# Patient Record
Sex: MALE | Race: WHITE | Employment: FULL TIME | URBAN - METROPOLITAN AREA
[De-identification: names, ages, dates, MRNs, and addresses within clinical notes are randomized per-mention and may not be internally consistent; named-entity substitution may affect disease eponyms.]

---
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
## 2021-02-19 DIAGNOSIS — J45.30 MILD PERSISTENT ASTHMA WITHOUT COMPLICATION: ICD-10-CM

## 2021-02-19 DIAGNOSIS — R06.2 WHEEZING: ICD-10-CM

## 2021-02-19 DIAGNOSIS — F90.1 ATTENTION DEFICIT HYPERACTIVITY DISORDER (ADHD), PREDOMINANTLY HYPERACTIVE TYPE: Chronic | ICD-10-CM

## 2021-02-19 RX ORDER — ALBUTEROL SULFATE 0.83 MG/ML
2.5 SOLUTION RESPIRATORY (INHALATION)
Qty: 25 EACH | Refills: 0 | Status: SHIPPED | OUTPATIENT
Start: 2021-02-19 | End: 2021-12-26 | Stop reason: SDUPTHER

## 2021-02-19 RX ORDER — PREDNISONE 10 MG/1
TABLET ORAL
Qty: 21 TAB | Refills: 0 | Status: SHIPPED | OUTPATIENT
Start: 2021-02-19 | End: 2021-05-28

## 2021-05-21 RX ORDER — CYCLOBENZAPRINE HCL 10 MG
10 TABLET ORAL
Qty: 30 TABLET | Refills: 1 | Status: SHIPPED | OUTPATIENT
Start: 2021-05-21 | End: 2022-03-04

## 2021-05-28 ENCOUNTER — VIRTUAL VISIT (OUTPATIENT)
Dept: INTERNAL MEDICINE CLINIC | Age: 21
End: 2021-05-28
Payer: COMMERCIAL

## 2021-05-28 DIAGNOSIS — J01.10 ACUTE NON-RECURRENT FRONTAL SINUSITIS: Primary | ICD-10-CM

## 2021-05-28 DIAGNOSIS — F32.A DEPRESSION, UNSPECIFIED DEPRESSION TYPE: ICD-10-CM

## 2021-05-28 DIAGNOSIS — E78.5 DYSLIPIDEMIA: ICD-10-CM

## 2021-05-28 DIAGNOSIS — F90.1 ATTENTION DEFICIT HYPERACTIVITY DISORDER (ADHD), PREDOMINANTLY HYPERACTIVE TYPE: ICD-10-CM

## 2021-05-28 DIAGNOSIS — J45.40 MODERATE PERSISTENT ASTHMA WITHOUT COMPLICATION: ICD-10-CM

## 2021-05-28 DIAGNOSIS — Z11.59 ENCOUNTER FOR HEPATITIS C SCREENING TEST FOR LOW RISK PATIENT: ICD-10-CM

## 2021-05-28 PROCEDURE — 99214 OFFICE O/P EST MOD 30 MIN: CPT | Performed by: INTERNAL MEDICINE

## 2021-05-28 RX ORDER — SERTRALINE HYDROCHLORIDE 50 MG/1
50 TABLET, FILM COATED ORAL DAILY
Qty: 30 TABLET | Refills: 5 | Status: SHIPPED | OUTPATIENT
Start: 2021-05-28 | End: 2021-06-11 | Stop reason: ALTCHOICE

## 2021-05-28 RX ORDER — AMOXICILLIN AND CLAVULANATE POTASSIUM 875; 125 MG/1; MG/1
1 TABLET, FILM COATED ORAL EVERY 12 HOURS
Qty: 14 TABLET | Refills: 0 | Status: SHIPPED | OUTPATIENT
Start: 2021-05-28 | End: 2021-11-23

## 2021-05-28 NOTE — PROGRESS NOTES
Gayatri Javier is a 22 y.o. male who was seen by synchronous (real-time) audio-video technology on 5/28/2021 for Sinus Infection (going on for about 3-4 days, Advil was taken) and Depression        Progress Note       SUBJECTIVE:   Mr. Gayatri Javier is a 22 y.o. male who is here for follow up of routine medical issues. Chief Complaint   Patient presents with    Sinus Infection     going on for about 3-4 days, Advil was taken    Depression       He has had sinus pain, headaches, \"slight fever. \" Seen in urgent care, negative COVID. Depression: \"It has always been there, but never enough to overwhelm. \" He has had episodes of social withdrawal and \"I don't eat much. \" +\"Stressed\". He has not been on medications. He denies suicidality. Vyvanse works well for his ADHD. Insomnia: \"Depends\"--if he goes to bed late, he sleeps better. He is rarely using melatonin for sleep. At this time, he is otherwise doing well and has brought no other complaints to my attention today. For a list of the medical issues addressed today, see the assessment and plan below.     PMH:   Past Medical History:   Diagnosis Date    ADHD (attention deficit hyperactivity disorder)     Asthma     Bronchitis 10-21-05    2-21-06 1-26-07  1-21-09    Clavicle fracture 09-07-07    Family history of celiac disease 10/6/2017    Sister     Family history of diabetes mellitus (DM) 10/6/2017    sister    Fifth disease 3-29-08    Mono 4-4-05    Otitis 2-22-05    12 times since 2005 most [id number]    Otitis media     Pneumonia 11-10-05    Pneumonia 08/31/2016    BLL pneumonia    Reactive airway disease     Severe obesity ([address] 10/14/2019    Sinusitis 3-11-05    5 times since 2005    Strep sore throat 4-14-09 9-14-09       Past Surgical History:   Procedure Laterality Date    HX CIRCUMCISION      HX HEENT      HX TYMPANOSTOMY  7-2005       All: He is allergic to other food, tree nut, and zithromax [azithromycin]. Current Outpatient Medications   Medication Sig    cyclobenzaprine (FLEXERIL) 10 mg tablet Take 1 Tablet by mouth three (3) times daily as needed for Muscle Spasm(s).  lisdexamfetamine (Vyvanse) 30 mg capsule Take 1 Cap by mouth every morning. Max Daily Amount: 30 mg. Indications: attention deficit disorder with hyperactivity    albuterol (PROVENTIL VENTOLIN) 2.5 mg /3 mL (0.083 %) nebu 3 mL by Nebulization route every four (4) hours as needed for Wheezing.  predniSONE (STERAPRED DS) 10 mg dose pack See administration instruction per 10mg dose pack    albuterol (PROVENTIL HFA, VENTOLIN HFA, PROAIR HFA) 90 mcg/actuation inhaler INHALE 2 PUFFS BY MOUTH EVERY 6 HOURS AS NEEDED FOR WHEEZING    fluticasone propionate (FLOVENT HFA) 44 mcg/actuation inhaler Take 2 Puffs by inhalation two (2) times a day.  EPINEPHrine (EPIPEN) 0.3 mg/0.3 mL injection 0.3 mL by IntraMUSCular route once as needed. Indications: Anaphylaxis     No current facility-administered medications for this visit. FH: His family history includes Asthma in his father. SH: Currently he is out of college. He is  at [employer]. He reports that he has never smoked. He has never used smokeless tobacco. He reports that he does not drink alcohol and does not use drugs. ROS: See above; Complete ROS otherwise negative. OBJECTIVE:   Vitals: There were no vitals taken for this visit. Gen: Pleasant 22 y.o.  male in [facility].       Lab Results   Component Value Date/Time    Sodium 141 12/24/2019 10:24 AM    Potassium 3.7 12/24/2019 10:24 AM    Chloride 103 12/24/2019 10:24 AM    CO2 23 12/24/2019 10:24 AM    Glucose 75 12/24/2019 10:24 AM    BUN 11 12/24/2019 10:24 AM    Creatinine 1.05 12/24/2019 10:24 AM    BUN/Creatinine ratio 10 12/24/2019 10:24 AM    GFR est  12/24/2019 10:24 AM    GFR est non- 12/24/2019 10:24 AM    Calcium 9.6 12/24/2019 10:24 AM    Bilirubin, total 0.4 12/24/2019 10:24 AM    ALT (SGPT) 32 12/24/2019 10:24 AM    Alk. phosphatase 94 12/24/2019 10:24 AM    Protein, total 6.8 12/24/2019 10:24 AM    Albumin 4.5 12/24/2019 10:24 AM    A-G Ratio 2.0 12/24/2019 10:24 AM       Lab Results   Component Value Date/Time    Cholesterol, total 164 12/24/2019 10:24 AM    HDL Cholesterol 24 (L) 12/24/2019 10:24 AM    LDL, calculated 80 12/24/2019 10:24 AM    Triglyceride 300 (H) 12/24/2019 10:24 AM        Lab Results   Component Value Date/Time    Hemoglobin A1c 5.1 12/24/2019 10:24 AM       Lab Results   Component Value Date/Time    WBC 7.9 10/23/2018 11:19 AM    Hemoglobin (POC) 14.2 10/06/2017 03:53 PM    HGB 15.2 10/23/2018 11:19 AM    HCT 43.8 10/23/2018 11:19 AM    PLATELET 506 03/69/8296 11:19 AM    MCV 82 10/23/2018 11:19 AM         ASSESSMENT/ PLAN: Diagnoses and all orders for this visit:    Acute non-recurrent frontal sinusitis  -     amoxicillin-clavulanate (AUGMENTIN) 875-125 mg per tablet; Take 1 Tablet by mouth every twelve (12) hours. , Normal, Disp-14 Tablet, R-0    Depression, unspecified depression type: No SI. +Anhedonia, social withdrawal, appetite loss. +Anxiety. -     sertraline (ZOLOFT) 50 mg tablet; Take 1 Tablet by mouth daily. , Normal, Disp-30 Tablet, R-5    Mild persistent asthma: Continue inhaler. Rarely needs it these days. Attention deficit hyperactivity disorder (ADHD), predominantly hyperactive type: Consider psych referral down the road. For now, the vyvanse is doing well and we'll continue it.   -     lisdexamfetamine (VYVANSE) 30 mg capsule; Take 1 Cap by mouth every morning. Max Daily Amount: 30 mg. Indications: Attention Deficit Disorder with Hyperactivity  -     METABOLIC PANEL, COMPREHENSIVE    Dyslipidemia: Predominantly hypertriglyceridemia.   -     METABOLIC PANEL, COMPREHENSIVE  -     LIPID PANEL    Obesity ([address]): Diet and exercise. Allergic rhinitis, unspecified seasonality, unspecified trigger: Quiescent at this time. F/u in about a year.        I have reviewed the patient's medications and risks/side effects/benefits were discussed. Diagnosis(-es) explained to patient and questions answered. Literature provided where appropriate. Objective:   No flowsheet data found. [INSTRUCTIONS:  \"[x]\" Indicates a positive item  \"[]\" Indicates a negative item  -- DELETE ALL ITEMS NOT EXAMINED]    Constitutional: [x] Appears well-developed and well-nourished [x] No apparent distress      [] Abnormal -     Mental status: [x] Alert and awake  [x] Oriented to person/place/time [x] Able to follow commands    [] Abnormal -     Eyes:   EOM    [x]  Normal    [] Abnormal -   Sclera  [x]  Normal    [] Abnormal -          Discharge [x]  None visible   [] Abnormal -     HENT: [x] Normocephalic, atraumatic  [] Abnormal -   [x] Mouth/Throat: Mucous membranes are moist    External Ears [x] Normal  [] Abnormal -    Neck: [x] No visualized mass [] Abnormal -     Pulmonary/Chest: [x] Respiratory effort normal   [x] No visualized signs of difficulty breathing or respiratory distress        [] Abnormal -      Musculoskeletal:   [x] Normal gait with no signs of ataxia         [x] Normal range of motion of neck        [] Abnormal -     Neurological:        [x] No Facial Asymmetry (Cranial nerve 7 motor function) (limited exam due to video visit)          [x] No gaze palsy        [] Abnormal -          Skin:        [x] No significant exanthematous lesions or discoloration noted on facial skin         [] Abnormal -            Psychiatric:       [x] Normal Affect [] Abnormal -       Other pertinent observable physical exam findings:-        We discussed the expected course, resolution and complications of the diagnosis(es) in detail. Medication risks, benefits, costs, interactions, and alternatives were discussed as indicated. I advised him to contact the office if his condition worsens, changes or fails to improve as anticipated.  He expressed understanding with the diagnosis(es) and plan. Giovanni Rayo, who was evaluated through a patient-initiated, synchronous (real-time) audio-video encounter, and/or his healthcare decision maker, is aware that it is a billable service, with coverage as determined by his insurance carrier. He provided verbal consent to proceed: YES, and patient identification was verified. It was conducted pursuant to the emergency declaration under the [address] [place] [address] and the [employer] Act. A caregiver was present when appropriate. Ability to conduct physical exam was limited. I was in office. The patient was at home or otherwise outside the office.       Argentina Ross MD

## 2021-06-11 ENCOUNTER — TELEPHONE (OUTPATIENT)
Dept: INTERNAL MEDICINE CLINIC | Age: 21
End: 2021-06-11

## 2021-06-11 RX ORDER — VENLAFAXINE HYDROCHLORIDE 75 MG/1
75 CAPSULE, EXTENDED RELEASE ORAL DAILY
Qty: 30 CAPSULE | Refills: 5 | Status: SHIPPED | OUTPATIENT
Start: 2021-06-11 | End: 2021-07-03 | Stop reason: SINTOL

## 2021-06-11 NOTE — TELEPHONE ENCOUNTER
Patient's mother, Ledy Parker, stats she needs a call back to discuss Possible Medication reaction to medication prescribed by Dr. Checo Zurita on 5/28/21. Please call to discuss.  Thank you

## 2021-06-11 NOTE — TELEPHONE ENCOUNTER
Called out and spoke to pt. Two pt identifiers confirmed. Started zoloft and he got blood work done yesterday and he freaked out and passed out not normal for him and went to the dentist and the needle freaked him out as well but did not faint. He feels better but the anxiety has kicked in. Mom wants   Without an ssri. Informed the pt's mother rx was sent in. Pt verbalized understanding of information discussed w/ no further questions at this time.

## 2021-07-03 ENCOUNTER — TELEPHONE (OUTPATIENT)
Dept: INTERNAL MEDICINE CLINIC | Age: 21
End: 2021-07-03

## 2021-07-03 RX ORDER — BUPROPION HYDROCHLORIDE 150 MG/1
150 TABLET ORAL
Qty: 30 TABLET | Refills: 11 | Status: SHIPPED | OUTPATIENT
Start: 2021-07-03 | End: 2022-03-04

## 2021-07-03 RX ORDER — ESCITALOPRAM OXALATE 10 MG/1
10 TABLET ORAL DAILY
Qty: 30 TABLET | Refills: 11 | Status: SHIPPED | OUTPATIENT
Start: 2021-07-03 | End: 2021-07-03 | Stop reason: CLARIF

## 2021-11-23 RX ORDER — AMOXICILLIN 500 MG/1
500 CAPSULE ORAL 3 TIMES DAILY
Qty: 21 CAPSULE | Refills: 0 | Status: SHIPPED | OUTPATIENT
Start: 2021-11-23 | End: 2021-11-30

## 2021-12-03 DIAGNOSIS — Z82.79 FAMILY HISTORY OF CONGENITAL HEART DEFECT: Primary | ICD-10-CM

## 2021-12-03 DIAGNOSIS — R06.00 DYSPNEA, UNSPECIFIED TYPE: ICD-10-CM

## 2021-12-26 ENCOUNTER — TELEPHONE (OUTPATIENT)
Dept: INTERNAL MEDICINE CLINIC | Age: 21
End: 2021-12-26

## 2021-12-26 DIAGNOSIS — R06.2 WHEEZING: Primary | ICD-10-CM

## 2021-12-26 RX ORDER — ALBUTEROL SULFATE 0.83 MG/ML
2.5 SOLUTION RESPIRATORY (INHALATION)
Qty: 25 EACH | Refills: 3 | Status: SHIPPED | OUTPATIENT
Start: 2021-12-26

## 2021-12-27 NOTE — TELEPHONE ENCOUNTER
Orders Placed This Encounter    albuterol (PROVENTIL VENTOLIN) 2.5 mg /3 mL (0.083 %) nebu     Sig: 3 mL by Nebulization route every four (4) hours as needed for Wheezing.      Dispense:  25 Each     Refill:  3

## 2022-01-17 ENCOUNTER — HOSPITAL ENCOUNTER (OUTPATIENT)
Dept: NON INVASIVE DIAGNOSTICS | Age: 22
Discharge: HOME | End: 2022-01-17
Attending: INTERNAL MEDICINE
Payer: COMMERCIAL

## 2022-01-17 VITALS — HEIGHT: 69 IN | WEIGHT: 231.48 LBS | BODY MASS INDEX: 34.29 KG/M2

## 2022-01-17 DIAGNOSIS — Z82.79 FAMILY HISTORY OF CONGENITAL HEART DEFECT: ICD-10-CM

## 2022-01-17 DIAGNOSIS — R06.00 DYSPNEA, UNSPECIFIED TYPE: ICD-10-CM

## 2022-01-17 LAB
ECHO AO ROOT DIAM: 2.8 CM
ECHO AO ROOT INDEX: 1.27 CM/M2
ECHO AV AREA PEAK VELOCITY: 3 CM2
ECHO AV AREA PEAK VELOCITY: 3.1 CM2
ECHO AV AREA VTI: 3.1 CM2
ECHO AV AREA VTI: 3.1 CM2
ECHO AV MEAN GRADIENT: 2 MMHG
ECHO AV MEAN VELOCITY: 0.7 M/S
ECHO AV PEAK GRADIENT: 4 MMHG
ECHO AV PEAK GRADIENT: 4 MMHG
ECHO AV PEAK VELOCITY: 1 M/S
ECHO AV PEAK VELOCITY: 1 M/S
ECHO AV VTI: 17.8 CM
ECHO LA DIAMETER INDEX: 1.64 CM/M2
ECHO LA DIAMETER: 3.6 CM
ECHO LA TO AORTIC ROOT RATIO: 1.29
ECHO LA VOL 2C: 28 ML (ref 18–58)
ECHO LA VOL 4C: 45 ML (ref 18–58)
ECHO LA VOLUME AREA LENGTH: 45 ML
ECHO LA VOLUME INDEX A2C: 13 ML/M2 (ref 16–34)
ECHO LA VOLUME INDEX A4C: 20 ML/M2 (ref 16–34)
ECHO LA VOLUME INDEX AREA LENGTH: 20 ML/M2 (ref 16–34)
ECHO LV E' LATERAL VELOCITY: 18 CM/S
ECHO LV E' SEPTAL VELOCITY: 9 CM/S
ECHO LV FRACTIONAL SHORTENING: 31 % (ref 28–44)
ECHO LV INTERNAL DIMENSION DIASTOLE INDEX: 1.91 CM/M2
ECHO LV INTERNAL DIMENSION DIASTOLIC: 4.2 CM (ref 4.2–5.9)
ECHO LV INTERNAL DIMENSION SYSTOLIC INDEX: 1.32 CM/M2
ECHO LV INTERNAL DIMENSION SYSTOLIC: 2.9 CM
ECHO LV IVSD: 1.1 CM (ref 0.6–1)
ECHO LV IVSS: 1.3 CM
ECHO LV MASS 2D: 167.4 G (ref 88–224)
ECHO LV MASS INDEX 2D: 76.1 G/M2 (ref 49–115)
ECHO LV POSTERIOR WALL DIASTOLIC: 1.2 CM (ref 0.6–1)
ECHO LV POSTERIOR WALL SYSTOLIC: 1.5 CM
ECHO LV RELATIVE WALL THICKNESS RATIO: 0.57
ECHO LVOT AREA: 3.5 CM2
ECHO LVOT AV VTI INDEX: 0.92
ECHO LVOT DIAM: 2.1 CM
ECHO LVOT MEAN GRADIENT: 1 MMHG
ECHO LVOT PEAK GRADIENT: 3 MMHG
ECHO LVOT PEAK GRADIENT: 3 MMHG
ECHO LVOT PEAK VELOCITY: 0.9 M/S
ECHO LVOT PEAK VELOCITY: 0.9 M/S
ECHO LVOT STROKE VOLUME INDEX: 25.8 ML/M2
ECHO LVOT SV: 56.8 ML
ECHO LVOT VTI: 16.4 CM
ECHO MV A VELOCITY: 0.42 M/S
ECHO MV E DECELERATION TIME (DT): 161.8 MS
ECHO MV E VELOCITY: 0.72 M/S
ECHO MV E/A RATIO: 1.71
ECHO MV E/E' LATERAL: 4
ECHO MV E/E' RATIO (AVERAGED): 6
ECHO MV E/E' SEPTAL: 8
ECHO PV MAX VELOCITY: 1.1 M/S
ECHO PV PEAK GRADIENT: 5 MMHG
ECHO RV FREE WALL PEAK S': 15 CM/S
ECHO RV INTERNAL DIMENSION: 3.1 CM
ECHO TV REGURGITANT MAX VELOCITY: 1.7 M/S
ECHO TV REGURGITANT PEAK GRADIENT: 12 MMHG

## 2022-01-17 PROCEDURE — 93306 TTE W/DOPPLER COMPLETE: CPT | Performed by: INTERNAL MEDICINE

## 2022-01-17 PROCEDURE — 93306 TTE W/DOPPLER COMPLETE: CPT

## 2022-01-18 ENCOUNTER — PATIENT MESSAGE (OUTPATIENT)
Dept: INTERNAL MEDICINE CLINIC | Age: 22
End: 2022-01-18

## 2022-02-15 ENCOUNTER — TELEPHONE (OUTPATIENT)
Dept: INTERNAL MEDICINE CLINIC | Age: 22
End: 2022-02-15

## 2022-02-15 NOTE — TELEPHONE ENCOUNTER
Per [facility] Patient's Mother, [facility] states she needs a call back to get patient's test results & also to discuss getting My Chart Code. Please call.  Thank you

## 2022-03-04 ENCOUNTER — OFFICE VISIT (OUTPATIENT)
Dept: INTERNAL MEDICINE CLINIC | Age: 22
End: 2022-03-04
Payer: COMMERCIAL

## 2022-03-04 VITALS
RESPIRATION RATE: 18 BRPM | HEIGHT: 69 IN | HEART RATE: 70 BPM | WEIGHT: 226 LBS | SYSTOLIC BLOOD PRESSURE: 123 MMHG | TEMPERATURE: 98.3 F | DIASTOLIC BLOOD PRESSURE: 74 MMHG | BODY MASS INDEX: 33.47 KG/M2 | OXYGEN SATURATION: 97 %

## 2022-03-04 DIAGNOSIS — R53.83 FATIGUE, UNSPECIFIED TYPE: ICD-10-CM

## 2022-03-04 DIAGNOSIS — R41.3 MEMORY IMPAIRMENT: ICD-10-CM

## 2022-03-04 DIAGNOSIS — F90.1 ATTENTION DEFICIT HYPERACTIVITY DISORDER (ADHD), PREDOMINANTLY HYPERACTIVE TYPE: Primary | ICD-10-CM

## 2022-03-04 DIAGNOSIS — R42 DIZZINESS: ICD-10-CM

## 2022-03-04 DIAGNOSIS — Z23 NEEDS FLU SHOT: ICD-10-CM

## 2022-03-04 DIAGNOSIS — E78.5 DYSLIPIDEMIA: ICD-10-CM

## 2022-03-04 LAB
ALBUMIN SERPL-MCNC: 4 G/DL (ref 3.5–5)
ALBUMIN/GLOB SERPL: 1.2 {RATIO} (ref 1.1–2.2)
ALP SERPL-CCNC: 99 U/L (ref 45–117)
ALT SERPL-CCNC: 50 U/L (ref 12–78)
ANION GAP SERPL CALC-SCNC: 6 MMOL/L (ref 5–15)
AST SERPL-CCNC: 26 U/L (ref 15–37)
BASOPHILS # BLD: 0.1 K/UL (ref 0–0.1)
BASOPHILS NFR BLD: 0 % (ref 0–1)
BILIRUB SERPL-MCNC: 0.5 MG/DL (ref 0.2–1)
BUN SERPL-MCNC: 14 MG/DL (ref 6–20)
BUN/CREAT SERPL: 14 (ref 12–20)
CALCIUM SERPL-MCNC: 9.4 MG/DL (ref 8.5–10.1)
CHLORIDE SERPL-SCNC: 106 MMOL/L (ref 97–108)
CHOLEST SERPL-MCNC: 160 MG/DL
CO2 SERPL-SCNC: 25 MMOL/L (ref 21–32)
COMMENT, HOLDF: NORMAL
CREAT SERPL-MCNC: 1.02 MG/DL (ref 0.7–1.3)
DIFFERENTIAL METHOD BLD: ABNORMAL
EOSINOPHIL # BLD: 0.3 K/UL (ref 0–0.4)
EOSINOPHIL NFR BLD: 3 % (ref 0–7)
ERYTHROCYTE [DISTWIDTH] IN BLOOD BY AUTOMATED COUNT: 13.1 % (ref 11.5–14.5)
ERYTHROCYTE [SEDIMENTATION RATE] IN BLOOD: 2 MM/HR (ref 0–15)
GLOBULIN SER CALC-MCNC: 3.4 G/DL (ref 2–4)
GLUCOSE SERPL-MCNC: 105 MG/DL (ref 65–100)
HCT VFR BLD AUTO: 48.1 % (ref 36.6–50.3)
HDLC SERPL-MCNC: 23 MG/DL
HDLC SERPL: 7 {RATIO} (ref 0–5)
HGB BLD-MCNC: 16.1 G/DL (ref 12.1–17)
IMM GRANULOCYTES # BLD AUTO: 0 K/UL (ref 0–0.04)
IMM GRANULOCYTES NFR BLD AUTO: 0 % (ref 0–0.5)
LDLC SERPL CALC-MCNC: ABNORMAL MG/DL (ref 0–100)
LYMPHOCYTES # BLD: 2 K/UL (ref 0.8–3.5)
LYMPHOCYTES NFR BLD: 18 % (ref 12–49)
MCH RBC QN AUTO: 28.7 PG (ref 26–34)
MCHC RBC AUTO-ENTMCNC: 33.5 G/DL (ref 30–36.5)
MCV RBC AUTO: 85.7 FL (ref 80–99)
MONOCYTES # BLD: 0.5 K/UL (ref 0–1)
MONOCYTES NFR BLD: 5 % (ref 5–13)
NEUTS SEG # BLD: 8.3 K/UL (ref 1.8–8)
NEUTS SEG NFR BLD: 74 % (ref 32–75)
NRBC # BLD: 0 K/UL (ref 0–0.01)
NRBC BLD-RTO: 0 PER 100 WBC
PLATELET # BLD AUTO: 277 K/UL (ref 150–400)
PMV BLD AUTO: 10.6 FL (ref 8.9–12.9)
POTASSIUM SERPL-SCNC: 4.2 MMOL/L (ref 3.5–5.1)
PROT SERPL-MCNC: 7.4 G/DL (ref 6.4–8.2)
RBC # BLD AUTO: 5.61 M/UL (ref 4.1–5.7)
SAMPLES BEING HELD,HOLD: NORMAL
SODIUM SERPL-SCNC: 137 MMOL/L (ref 136–145)
TRIGL SERPL-MCNC: 736 MG/DL (ref ?–150)
TSH SERPL DL<=0.05 MIU/L-ACNC: 0.99 UIU/ML (ref 0.36–3.74)
VLDLC SERPL CALC-MCNC: ABNORMAL MG/DL
WBC # BLD AUTO: 11.3 K/UL (ref 4.1–11.1)

## 2022-03-04 PROCEDURE — 90471 IMMUNIZATION ADMIN: CPT | Performed by: INTERNAL MEDICINE

## 2022-03-04 PROCEDURE — 99214 OFFICE O/P EST MOD 30 MIN: CPT | Performed by: INTERNAL MEDICINE

## 2022-03-04 PROCEDURE — 90682 RIV4 VACC RECOMBINANT DNA IM: CPT | Performed by: INTERNAL MEDICINE

## 2022-03-04 NOTE — PROGRESS NOTES
SUBJECTIVE:   Mr. Edwige Snow is a 22 y.o. male who is here for follow up of routine medical issues. Chief Complaint   Patient presents with    Lethargy     Tired, and not feel right        He feels fatigued all the time. He has episodes of \"dizziness\" --lightheaded. \"It was bad about a week and a half ago; it has calmed down since then. \"     He has moments once in a while of feeling depersonalized, and \"I get a sudden rush of negative emotion. \"     He feels his memory is impaired. \"I have memory gaps. \" Poor short term memory. Depression: \"It has always been there, but never enough to overwhelm. \" He has had episodes of social withdrawal and \"I don't eat much. \" +\"Stressed\". He has not been on medications. He denies suicidality. ADHD: He is off Vyvanse. Insomnia: He wakes up every hour or so. He is rarely using melatonin for sleep. At this time, he is otherwise doing well and has brought no other complaints to my attention today. For a list of the medical issues addressed today, see the assessment and plan below. PMH:   Past Medical History:   Diagnosis Date    ADHD (attention deficit hyperactivity disorder)     Asthma     Bronchitis 10-21-05    2-21-06 1-26-07  1-21-09    Clavicle fracture 09-07-07    Family history of celiac disease 10/6/2017    Sister     Family history of diabetes mellitus (DM) 10/6/2017    sister    Fifth disease 3-29-08    Mono 4-4-05    Otitis 2-22-05    12 times since 2005 most [id number]    Otitis media     Pneumonia 11-10-05    Pneumonia 08/31/2016    BLL pneumonia    Reactive airway disease     Severe obesity ([facility] [address] 10/14/2019    Sinusitis 3-11-05    5 times since 2005    Strep sore throat 4-14-09 9-14-09       Past Surgical History:   Procedure Laterality Date    HX CIRCUMCISION      HX HEENT      HX TYMPANOSTOMY  7-2005       All: He is allergic to other food, tree nut, and zithromax [azithromycin].    Current Outpatient Medications   Medication Sig    albuterol (PROVENTIL VENTOLIN) 2.5 mg /3 mL (0.083 %) nebu 3 mL by Nebulization route every four (4) hours as needed for Wheezing.  buPROPion XL (WELLBUTRIN XL) 150 mg tablet Take 1 Tablet by mouth every morning.  cyclobenzaprine (FLEXERIL) 10 mg tablet Take 1 Tablet by mouth three (3) times daily as needed for Muscle Spasm(s).  lisdexamfetamine (Vyvanse) 30 mg capsule Take 1 Cap by mouth every morning. Max Daily Amount: 30 mg. Indications: attention deficit disorder with hyperactivity    albuterol (PROVENTIL HFA, VENTOLIN HFA, PROAIR HFA) 90 mcg/actuation inhaler INHALE 2 PUFFS BY MOUTH EVERY 6 HOURS AS NEEDED FOR WHEEZING    fluticasone propionate (FLOVENT HFA) 44 mcg/actuation inhaler Take 2 Puffs by inhalation two (2) times a day.  EPINEPHrine (EPIPEN) 0.3 mg/0.3 mL injection 0.3 mL by IntraMUSCular route once as needed. Indications: Anaphylaxis     No current facility-administered medications for this visit. FH: His family history includes Asthma in his father. SH: Currently he is out of college. He is driving for [employer]. He reports that he has never smoked. He has never used smokeless tobacco. He reports that he does not drink alcohol and does not use drugs. ROS: See above; Complete ROS otherwise negative. OBJECTIVE:   Vitals:   Visit Vitals  /74   Pulse 70   Temp 98.3 °F (36.8 °C) (Temporal)   Resp 18   Ht 5' 9\" (1.753 m)   Wt 226 lb (102.5 kg)   SpO2 97%   BMI 33.37 kg/m²      Gen: Pleasant 22 y.o.  male in [facility]. HEENT: PERRLA. EOMI. OP moist and pink. Neck: Supple. No LAD. HEART: RRR, No M/G/R.    LUNGS: CTAB No W/R. ABDOMEN: S, NT, ND, BS+. EXTREMITIES: Warm. No C/C/E.  MUSCULOSKELETAL: Normal ROM, muscle strength 5/5 all groups. NEURO: Alert and oriented x 3. Cranial nerves grossly intact. No focal sensory or motor deficits noted. SKIN: Warm. Dry. No rashes or other lesions noted.     Lab Results   Component Value Date/Time    Sodium 141 12/24/2019 10:24 AM    Potassium 3.7 12/24/2019 10:24 AM    Chloride 103 12/24/2019 10:24 AM    CO2 23 12/24/2019 10:24 AM    Glucose 75 12/24/2019 10:24 AM    BUN 11 12/24/2019 10:24 AM    Creatinine 1.05 12/24/2019 10:24 AM    BUN/Creatinine ratio 10 12/24/2019 10:24 AM    GFR est  12/24/2019 10:24 AM    GFR est non- 12/24/2019 10:24 AM    Calcium 9.6 12/24/2019 10:24 AM    Bilirubin, total 0.4 12/24/2019 10:24 AM    ALT (SGPT) 32 12/24/2019 10:24 AM    Alk. phosphatase 94 12/24/2019 10:24 AM    Protein, total 6.8 12/24/2019 10:24 AM    Albumin 4.5 12/24/2019 10:24 AM    A-G Ratio 2.0 12/24/2019 10:24 AM       Lab Results   Component Value Date/Time    Cholesterol, total 164 12/24/2019 10:24 AM    HDL Cholesterol 24 (L) 12/24/2019 10:24 AM    LDL, calculated 80 12/24/2019 10:24 AM    Triglyceride 300 (H) 12/24/2019 10:24 AM        Lab Results   Component Value Date/Time    Hemoglobin A1c 5.1 12/24/2019 10:24 AM       Lab Results   Component Value Date/Time    WBC 7.9 10/23/2018 11:19 AM    Hemoglobin (POC) 14.2 10/06/2017 03:53 PM    HGB 15.2 10/23/2018 11:19 AM    HCT 43.8 10/23/2018 11:19 AM    PLATELET 720 56/82/1280 11:19 AM    MCV 82 10/23/2018 11:19 AM         ASSESSMENT/ PLAN: Diagnoses and all orders for this visit:    Fatigue/malaise: Differential diagnosis for this is broad, and includes mood disorder, endocrine abnormalities such as hypothyroidism, anemia, ASIA, inflammation, neoplasm, autoimmune disease, medication side effect (eg beta blocker). We will check appropriate labs. Depression, unspecified depression type: No SI. +Anhedonia, social withdrawal, appetite loss. +Anxiety. He has been prescribed zoloft in the past.     Mild persistent asthma: Continue inhaler. Rarely needs it these days. Attention deficit hyperactivity disorder (ADHD), predominantly hyperactive type: Consider psych referral down the road. For now, the vyvanse is doing well and we'll continue it.    - lisdexamfetamine (VYVANSE) 30 mg capsule; Take 1 Cap by mouth every morning. Max Daily Amount: 30 mg. Indications: Attention Deficit Disorder with Hyperactivity  -     METABOLIC PANEL, COMPREHENSIVE    Dyslipidemia: Predominantly hypertriglyceridemia.   -     METABOLIC PANEL, COMPREHENSIVE  -     LIPID PANEL    Obesity ([facility] [address]): Diet and exercise. Allergic rhinitis, unspecified seasonality, unspecified trigger: Quiescent at this time. F/u in about a year. I have reviewed the patient's medications and risks/side effects/benefits were discussed. Diagnosis(-es) explained to patient and questions answered. Literature provided where appropriate.

## 2022-03-04 NOTE — PROGRESS NOTES
Cathy Wills is a 22 y.o. male  Chief Complaint   Patient presents with    Dizziness    Memory Loss     Memory issues    Lethargy     Tired   [address] Maintenance Due   Topic Date Due    Hepatitis C Screening  Never done     Visit Vitals  /74   Pulse 70   Temp 98.3 °F (36.8 °C) (Temporal)   Resp 18   Ht 5' 9\" (1.753 m)   Wt 226 lb (102.5 kg)   SpO2 97%   BMI 33.37 kg/m²       1. \"Have you been to the ER, urgent care clinic since your last visit? Hospitalized since your last visit? \" No    2. \"Have you seen or consulted any other health care providers outside of the [address] [place] [address] since your last visit? \" No     3. For patients aged 39-70: Has the patient had a colonoscopy / FIT/ Cologuard?  Yes - no Care Gap present

## 2022-03-05 LAB — LDLC SERPL DIRECT ASSAY-MCNC: 89 MG/DL (ref 0–100)

## 2022-03-07 ENCOUNTER — TELEPHONE (OUTPATIENT)
Dept: INTERNAL MEDICINE CLINIC | Age: 22
End: 2022-03-07

## 2022-03-07 RX ORDER — FENOFIBRATE 145 MG/1
145 TABLET, COATED ORAL DAILY
Qty: 30 TABLET | Refills: 11 | Status: SHIPPED | OUTPATIENT
Start: 2022-03-07

## 2022-03-19 PROBLEM — Z83.79 FAMILY HISTORY OF CELIAC DISEASE: Status: ACTIVE | Noted: 2017-10-06

## 2022-03-19 PROBLEM — E66.01 SEVERE OBESITY (HCC): Status: ACTIVE | Noted: 2019-10-14

## 2022-03-20 PROBLEM — Z83.3 FAMILY HISTORY OF DIABETES MELLITUS (DM): Status: ACTIVE | Noted: 2017-10-06

## 2022-04-22 ENCOUNTER — TELEPHONE (OUTPATIENT)
Dept: INTERNAL MEDICINE CLINIC | Age: 22
End: 2022-04-22

## 2022-04-28 DIAGNOSIS — F90.1 ATTENTION DEFICIT HYPERACTIVITY DISORDER (ADHD), PREDOMINANTLY HYPERACTIVE TYPE: Chronic | ICD-10-CM

## 2022-06-27 DIAGNOSIS — F90.1 ATTENTION DEFICIT HYPERACTIVITY DISORDER (ADHD), PREDOMINANTLY HYPERACTIVE TYPE: Chronic | ICD-10-CM

## 2022-08-03 ENCOUNTER — TELEPHONE (OUTPATIENT)
Dept: INTERNAL MEDICINE CLINIC | Age: 22
End: 2022-08-03

## 2022-08-03 DIAGNOSIS — E78.5 DYSLIPIDEMIA: Primary | ICD-10-CM

## 2022-08-03 NOTE — TELEPHONE ENCOUNTER
Received call from patient parent [facility] (HIPAA)  Two pt identifiers confirmed. Parent is inquiring about repeat lipid panel.

## 2022-08-04 NOTE — TELEPHONE ENCOUNTER
Call placed to patient parent Irma Gonzalez (HIPAA)  Two pt identifiers confirmed. Notified that labs have been ordered and patient may come to office for completion. Pt verbalized understanding of information discussed w/ no further questions at this time.

## 2022-08-09 DIAGNOSIS — F90.1 ATTENTION DEFICIT HYPERACTIVITY DISORDER (ADHD), PREDOMINANTLY HYPERACTIVE TYPE: Chronic | ICD-10-CM

## 2022-08-10 NOTE — TELEPHONE ENCOUNTER
Future Appointments:  No future appointments. Last Appointment With Me:  3/4/2022     Requested Prescriptions     Pending Prescriptions Disp Refills    lisdexamfetamine (Vyvanse) 30 mg capsule 30 Capsule 0     Sig: Take 1 Capsule by mouth every morning. Max Daily Amount: 30 mg.  Indications: attention deficit disorder with hyperactivity

## 2022-09-06 ENCOUNTER — APPOINTMENT (OUTPATIENT)
Dept: INTERNAL MEDICINE CLINIC | Age: 22
End: 2022-09-06

## 2022-09-06 DIAGNOSIS — E78.5 DYSLIPIDEMIA: ICD-10-CM

## 2022-09-07 ENCOUNTER — DOCUMENTATION ONLY (OUTPATIENT)
Dept: INTERNAL MEDICINE CLINIC | Age: 22
End: 2022-09-07

## 2022-09-07 LAB
ALBUMIN SERPL-MCNC: 4.2 G/DL (ref 3.5–5)
ALBUMIN/GLOB SERPL: 1.1 {RATIO} (ref 1.1–2.2)
ALP SERPL-CCNC: 64 U/L (ref 45–117)
ALT SERPL-CCNC: 40 U/L (ref 12–78)
ANION GAP SERPL CALC-SCNC: 7 MMOL/L (ref 5–15)
AST SERPL-CCNC: 20 U/L (ref 15–37)
BILIRUB SERPL-MCNC: 0.3 MG/DL (ref 0.2–1)
BUN SERPL-MCNC: 13 MG/DL (ref 6–20)
BUN/CREAT SERPL: 11 (ref 12–20)
CALCIUM SERPL-MCNC: 9.8 MG/DL (ref 8.5–10.1)
CHLORIDE SERPL-SCNC: 108 MMOL/L (ref 97–108)
CHOLEST SERPL-MCNC: 166 MG/DL
CO2 SERPL-SCNC: 25 MMOL/L (ref 21–32)
CREAT SERPL-MCNC: 1.18 MG/DL (ref 0.7–1.3)
GLOBULIN SER CALC-MCNC: 3.8 G/DL (ref 2–4)
GLUCOSE SERPL-MCNC: 97 MG/DL (ref 65–100)
HDLC SERPL-MCNC: 31 MG/DL
HDLC SERPL: 5.4 {RATIO} (ref 0–5)
LDLC SERPL CALC-MCNC: 84.4 MG/DL (ref 0–100)
POTASSIUM SERPL-SCNC: 4.1 MMOL/L (ref 3.5–5.1)
PROT SERPL-MCNC: 8 G/DL (ref 6.4–8.2)
SODIUM SERPL-SCNC: 140 MMOL/L (ref 136–145)
TRIGL SERPL-MCNC: 253 MG/DL (ref ?–150)
VLDLC SERPL CALC-MCNC: 50.6 MG/DL

## 2022-09-22 DIAGNOSIS — F90.1 ATTENTION DEFICIT HYPERACTIVITY DISORDER (ADHD), PREDOMINANTLY HYPERACTIVE TYPE: Chronic | ICD-10-CM

## 2022-09-23 NOTE — TELEPHONE ENCOUNTER
Future Appointments:  No future appointments. Last Appointment With Me:  3/4/22    Requested Prescriptions     Pending Prescriptions Disp Refills    lisdexamfetamine (Vyvanse) 30 mg capsule 30 Capsule 0     Sig: Take 1 Capsule by mouth every morning. Max Daily Amount: 30 mg.  Indications: attention deficit disorder with hyperactivity

## 2022-11-07 DIAGNOSIS — F90.1 ATTENTION DEFICIT HYPERACTIVITY DISORDER (ADHD), PREDOMINANTLY HYPERACTIVE TYPE: Chronic | ICD-10-CM

## 2022-12-18 DIAGNOSIS — F90.1 ATTENTION DEFICIT HYPERACTIVITY DISORDER (ADHD), PREDOMINANTLY HYPERACTIVE TYPE: Chronic | ICD-10-CM

## 2022-12-19 NOTE — TELEPHONE ENCOUNTER
Future Appointments:  No future appointments. Last Appointment With Me:  Visit date not found     Requested Prescriptions     Pending Prescriptions Disp Refills    lisdexamfetamine (Vyvanse) 30 mg capsule 30 Capsule 0     Sig: Take 1 Capsule by mouth every morning. Max Daily Amount: 30 mg.  Indications: attention deficit disorder with hyperactivity

## 2023-02-07 DIAGNOSIS — F90.1 ATTENTION DEFICIT HYPERACTIVITY DISORDER (ADHD), PREDOMINANTLY HYPERACTIVE TYPE: Chronic | ICD-10-CM

## 2023-04-25 RX ORDER — FENOFIBRATE 145 MG/1
145 TABLET, COATED ORAL DAILY
Qty: 30 TABLET | Refills: 11 | Status: SHIPPED | OUTPATIENT
Start: 2023-04-25

## 2023-04-25 NOTE — TELEPHONE ENCOUNTER
Future Appointments:  No future appointments. Last Appointment With Me:  Visit date not found     Requested Prescriptions     Pending Prescriptions Disp Refills    fenofibrate nanocrystallized (TRICOR) 145 mg tablet 30 Tablet 11     Sig: Take 1 Tablet by mouth daily.

## 2023-08-17 NOTE — TELEPHONE ENCOUNTER
Mom requests prescription lice shampoo called in to pharmacy on file ([facility] [address]) as patient and sibling have nice. Advised that mom could try treating with mayonnaise or vaseline in the hair overnight, wrapped in plastic wrap or a shower cap with a towel or bandana around the head to suffocate the lice & then washing and combing with a lice comb or fine toothed comb the next day. Mom will try this method and also  OTC lice shampoo, advised to use some of the lice shampoo to soak brushes/morales in for an hour. Advised to wash clothing and bedding in hot water and dry on dry heat, put anything unable to be washed into a plastic bag & tie off for two weeks. Mom requests prescription lice shampoo to be called into pharmacy on file [facility]) incase home treatment doesn't clear up lice as she would like patient & sibling to be able to return to school on Monday as she is concerned about needing product over the weekend when we are closed.
Patient mother called and needs a prescription due to her son having lice. Mother can be reached at [phone].
nicotine patches

## 2023-08-29 ENCOUNTER — TELEPHONE (OUTPATIENT)
Age: 23
End: 2023-08-29

## 2023-09-15 DIAGNOSIS — E78.5 HYPERLIPIDEMIA, UNSPECIFIED HYPERLIPIDEMIA TYPE: Primary | ICD-10-CM

## 2023-09-15 DIAGNOSIS — R53.83 OTHER FATIGUE: ICD-10-CM

## 2023-11-29 ENCOUNTER — TELEPHONE (OUTPATIENT)
Age: 23
End: 2023-11-29

## 2023-11-29 NOTE — TELEPHONE ENCOUNTER
Patient's mother, Dena States she needs a call back to discuss Patient with History of Asthma getting an In Office Appt for Bad Cough. Dena declined Virtual Visit Appt as she states she thinks patient's Chest needs to be listened to. Please call to advise. Thank you

## 2023-12-01 NOTE — TELEPHONE ENCOUNTER
Spoke w/ pt's mother, informed that pt has not been seen since 2022 and needs an appt. Pt's mother understood stated will call back and schedule as pt will not know school schedule until next year. They will call back and schedule overdue CPE for next year of 2024

## 2023-12-11 RX ORDER — OSELTAMIVIR PHOSPHATE 75 MG/1
75 CAPSULE ORAL DAILY
Qty: 10 CAPSULE | Refills: 0 | Status: SHIPPED | OUTPATIENT
Start: 2023-12-11 | End: 2023-12-21

## 2024-01-02 RX ORDER — ALBUTEROL SULFATE 90 UG/1
AEROSOL, METERED RESPIRATORY (INHALATION)
Qty: 18 G | Refills: 5 | Status: SHIPPED | OUTPATIENT
Start: 2024-01-02

## 2024-01-02 RX ORDER — ALBUTEROL SULFATE 2.5 MG/3ML
2.5 SOLUTION RESPIRATORY (INHALATION) EVERY 4 HOURS PRN
Qty: 120 EACH | Refills: 3 | Status: SHIPPED | OUTPATIENT
Start: 2024-01-02

## 2025-03-07 RX ORDER — ALBUTEROL SULFATE 90 UG/1
INHALANT RESPIRATORY (INHALATION)
Qty: 18 G | Refills: 5 | Status: SHIPPED | OUTPATIENT
Start: 2025-03-07